# Patient Record
(demographics unavailable — no encounter records)

---

## 2024-10-11 NOTE — REASON FOR VISIT
[Patient preference] : as per patient preference [Continuity of care] : to ensure continuity of care [Continuing, patient seen in-person within last 12 months] : Telehealth services are continuing as patient has been seen in-person within last 12 months. [Telehealth (audio & video) - Individual/Group] : This visit was provided via telehealth using real-time 2-way audio visual technology. [Medical Office: ([facility])___] : The provider was located at the medical office in [unfilled]. [Home] : The patient, [unfilled], was located at home, [unfilled], at the time of the visit. [Patient's space is appropriate for telehealth and maintains privacy/confidentiality.] : Patient's space is appropriate for telehealth and maintains privacy/confidentiality. [Participant(s) identity verified] : Participant(s) identity verified. [Verbal consent obtained from patient/other participant(s)] : Verbal consent for telehealth/telephonic services obtained from patient/other participant(s) [Patient] : Patient [FreeTextEntry4] : 11:10 am  [FreeTextEntry5] : 11:40 am [FreeTextEntry2] : 06/21/2024 [FreeTextEntry1] : Pt seen as a follow up appointment.

## 2024-10-11 NOTE — PHYSICAL EXAM
[Cooperative] : cooperative [Euthymic] : euthymic [Full] : full [Clear] : clear [Linear/Goal Directed] : linear/goal directed [None] : none [None Reported] : none reported [Average] : average [WNL] : within normal limits [Well groomed] : well groomed

## 2024-10-11 NOTE — PLAN
[No] : No [Medication education provided] : Medication education provided. [Rationale for medication choices, possible risks/precautions, benefits, alternative treatment choices, and consequences of non-treatment discussed] : Rationale for medication choices, possible risks/precautions, benefits, alternative treatment choices, and consequences of non-treatment discussed with patient/family/caregiver  [FreeTextEntry4] : Meeting goals of care, clinically stable.  RTC 3 months.  [FreeTextEntry5] : I stop checked.  A	Y	N	B	09/29/2023	10/12/2023	clonazepam 0.5 mg tablet	15	15	Jemma Robles MD	[id number]	Medicaid	[id number] Duloxetine 40 mg Vistaril 10 mg prn Clonazepam 0.5 mg

## 2024-10-11 NOTE — HISTORY OF PRESENT ILLNESS
[FreeTextEntry1] : Pt is doing good things for herself and getting out of her comfort zone, like kick boxing.  Pt has not had a panic attack, while tearful she reports that it is not above her baseline "normal". Pt reports she is sensitive but denied being depressed. Pt does go on walks to self-regulate, pt has a yoga retreat planned.  Pt demonstrating personal growth. Pt will take Klonopin to fly and Vistaril if having irregular sleep due to environment change.  Will refill both medications, rare use of both. No suicidal ideation, hopelessness or helplessness, pt has a pit bull at home also a protective factor. Denied s/e from medication, tolerating without incident, no overuse or misuse, no recent fills of medicinal cannabis.

## 2025-01-17 NOTE — REASON FOR VISIT
[Continuing, patient seen in-person within last 12 months] : Telehealth services are continuing as patient has been seen in-person within last 12 months. [Telehealth (audio & video) - Individual/Group] : This visit was provided via telehealth using real-time 2-way audio visual technology. [Medical Office: ([facility])___] : The provider was located at the medical office in [unfilled]. [Home] : The patient, [unfilled], was located at home, [unfilled], at the time of the visit. [Patient's space is appropriate for telehealth and maintains privacy/confidentiality.] : Patient's space is appropriate for telehealth and maintains privacy/confidentiality. [Participant(s) identity verified] : Participant(s) identity verified. [Verbal consent obtained from patient/other participant(s)] : Verbal consent for telehealth/telephonic services obtained from patient/other participant(s) [Patient preference] : as per patient preference [Continuity of care] : to ensure continuity of care [Patient] : Patient [FreeTextEntry4] : 11:00am  [FreeTextEntry5] : 11:30am [FreeTextEntry2] : 06/21/2024 [FreeTextEntry1] : Pt here for medication management.

## 2025-01-17 NOTE — HISTORY OF PRESENT ILLNESS
[FreeTextEntry1] : Per pt serum cortisol was normal.  Per pt not diagnosed with PCOS.  Pt doing ok with rare use of Klonopin prn. Continue Duloxetine tolerating without incident.  Pt denied panic attacks. Pt mood stable, mild dysphoria due to PMS sx.  Denied AH/VH/delusions, no signs or sx of withdrawal.  No rk.

## 2025-01-17 NOTE — PLAN
[No] : No [Medication education provided] : Medication education provided. [Rationale for medication choices, possible risks/precautions, benefits, alternative treatment choices, and consequences of non-treatment discussed] : Rationale for medication choices, possible risks/precautions, benefits, alternative treatment choices, and consequences of non-treatment discussed with patient/family/caregiver  [FreeTextEntry4] : Meeting goals of care.  [FreeTextEntry5] : Continue Duloxetine, stable on 40 mg for MDD/BENNIE defer refill of Vistaril or Klonopin.

## 2025-04-11 NOTE — HISTORY OF PRESENT ILLNESS
[FreeTextEntry1] : Pt maintaining gains made, no panic symptoms, mild depression/demoralization around the area of dating. Discussed commonplace themes regarding dating and pitfalls typical of this process. Pt continues to work on self-image, self-esteem, socialization, her career aspirations.  Denied suicidal or homicidal ideation, no rk, no psychosis.  Clinically stable, discussed potential use of Modafinil for energy, concentration, treatment of excessive daytime sedation as sequelae of sleep apnea.

## 2025-04-11 NOTE — PLAN
[No] : No [Medication education provided] : Medication education provided. [Rationale for medication choices, possible risks/precautions, benefits, alternative treatment choices, and consequences of non-treatment discussed] : Rationale for medication choices, possible risks/precautions, benefits, alternative treatment choices, and consequences of non-treatment discussed with patient/family/caregiver  [FreeTextEntry4] : Meeting goals of care.  Pt to forward sleep study.  [FreeTextEntry5] : Discussed the potential use of Modafinil for sleep apnea.  Pt has sporadically taken Klonopin and Hydroxyzine, pt has been using prn.  Continue Duloxetine 40 mg daily.

## 2025-04-11 NOTE — REASON FOR VISIT
[Patient preference] : as per patient preference [Continuity of care] : to ensure continuity of care [Continuing, patient seen in-person within last 12 months] : Telehealth services are continuing as patient has been seen in-person within last 12 months. [Telehealth (audio & video) - Individual/Group] : This visit was provided via telehealth using real-time 2-way audio visual technology. [Medical Office: ([facility])___] : The provider was located at the medical office in [unfilled]. [Home] : The patient, [unfilled], was located at home, [unfilled], at the time of the visit. [Patient's space is appropriate for telehealth and maintains privacy/confidentiality.] : Patient's space is appropriate for telehealth and maintains privacy/confidentiality. [Participant(s) identity verified] : Participant(s) identity verified. [Verbal consent obtained from patient/other participant(s)] : Verbal consent for telehealth/telephonic services obtained from patient/other participant(s) [Patient] : Patient [FreeTextEntry4] : 11:30 am [FreeTextEntry5] : 12:00 pm

## 2025-06-12 NOTE — PLAN
[No] : No [Medication education provided] : Medication education provided. [Rationale for medication choices, possible risks/precautions, benefits, alternative treatment choices, and consequences of non-treatment discussed] : Rationale for medication choices, possible risks/precautions, benefits, alternative treatment choices, and consequences of non-treatment discussed with patient/family/caregiver  [FreeTextEntry4] : Meeting goals of care.  [FreeTextEntry5] : I stop checked.  A	Y	N	B	04/11/2025	04/12/2025	clonazepam 0.5 mg tablet	15	15	Jemma Robles MD	[id number]  Start Modafinil 100 mg (can start 50 mg and then titrate to 100 mg) for excessive daytime sedation.  Continue Duloxetine for BENNIE/MDD. Continue Vistaril as needed for sleep, and Klonopin (rare use for panic sx).

## 2025-06-12 NOTE — HISTORY OF PRESENT ILLNESS
[FreeTextEntry1] : Pt reports that she is struggling to adapt to her sleep apnea machine.  Pt at times will take the mask off during her sleep.  Pt discussed trying Modafinil as an adjunct for sleep apnea.  Target sx is anhedonia, increasing activity, decreasing apathy, and addressing excessive daytime sedation.  Pt reports she did have to take Klonopin while away due to crying spells. Pt remains medication adherent.  Rest of psychiatric ROS unremarkable or at baseline

## 2025-06-12 NOTE — PHYSICAL EXAM
[Well groomed] : well groomed [Cooperative] : cooperative [Euthymic] : euthymic [Full] : full [Clear] : clear [Linear/Goal Directed] : linear/goal directed [None] : none [None Reported] : none reported [Average] : average [WNL] : within normal limits [FreeTextEntry1] : Wearing a summer dress.

## 2025-06-12 NOTE — PLAN
[No] : No [Medication education provided] : Medication education provided. [Rationale for medication choices, possible risks/precautions, benefits, alternative treatment choices, and consequences of non-treatment discussed] : Rationale for medication choices, possible risks/precautions, benefits, alternative treatment choices, and consequences of non-treatment discussed with patient/family/caregiver  [FreeTextEntry4] : Meeting goals of care.  [FreeTextEntry5] : I stop checked.  A	Y	N	B	04/11/2025	04/12/2025	clonazepam 0.5 mg tablet	15	15	Jemma Robles MD	[id number]  Start Modafinil 100 mg (can start 50 mg and then titrate to 100 mg) for excessive daytime sedation.  Continue Duloxetine for BENNIE/MDD. Continue Vistaril as needed for sleep, and Klonopin (rare use for panic sx).